# Patient Record
Sex: FEMALE | Employment: OTHER | URBAN - METROPOLITAN AREA
[De-identification: names, ages, dates, MRNs, and addresses within clinical notes are randomized per-mention and may not be internally consistent; named-entity substitution may affect disease eponyms.]

---

## 2021-02-08 ENCOUNTER — OFFICE VISIT (OUTPATIENT)
Dept: ENT CLINIC | Age: 71
End: 2021-02-08
Payer: MEDICARE

## 2021-02-08 VITALS
HEART RATE: 61 BPM | WEIGHT: 134.2 LBS | SYSTOLIC BLOOD PRESSURE: 91 MMHG | TEMPERATURE: 98.6 F | DIASTOLIC BLOOD PRESSURE: 59 MMHG

## 2021-02-08 DIAGNOSIS — H92.02 OTALGIA OF LEFT EAR: Primary | ICD-10-CM

## 2021-02-08 PROCEDURE — G8484 FLU IMMUNIZE NO ADMIN: HCPCS | Performed by: OTOLARYNGOLOGY

## 2021-02-08 PROCEDURE — G8421 BMI NOT CALCULATED: HCPCS | Performed by: OTOLARYNGOLOGY

## 2021-02-08 PROCEDURE — 3017F COLORECTAL CA SCREEN DOC REV: CPT | Performed by: OTOLARYNGOLOGY

## 2021-02-08 PROCEDURE — 1036F TOBACCO NON-USER: CPT | Performed by: OTOLARYNGOLOGY

## 2021-02-08 PROCEDURE — 4040F PNEUMOC VAC/ADMIN/RCVD: CPT | Performed by: OTOLARYNGOLOGY

## 2021-02-08 PROCEDURE — G8427 DOCREV CUR MEDS BY ELIG CLIN: HCPCS | Performed by: OTOLARYNGOLOGY

## 2021-02-08 PROCEDURE — 1090F PRES/ABSN URINE INCON ASSESS: CPT | Performed by: OTOLARYNGOLOGY

## 2021-02-08 PROCEDURE — 99203 OFFICE O/P NEW LOW 30 MIN: CPT | Performed by: OTOLARYNGOLOGY

## 2021-02-08 PROCEDURE — 1123F ACP DISCUSS/DSCN MKR DOCD: CPT | Performed by: OTOLARYNGOLOGY

## 2021-02-08 PROCEDURE — G8400 PT W/DXA NO RESULTS DOC: HCPCS | Performed by: OTOLARYNGOLOGY

## 2021-02-08 RX ORDER — AMOXICILLIN 500 MG/1
500 CAPSULE ORAL 2 TIMES DAILY
COMMUNITY
Start: 2021-02-05 | End: 2021-02-08 | Stop reason: SDUPTHER

## 2021-02-08 RX ORDER — AMOXICILLIN 500 MG/1
500 CAPSULE ORAL 3 TIMES DAILY
Qty: 30 CAPSULE | Refills: 0 | Status: SHIPPED | OUTPATIENT
Start: 2021-02-08 | End: 2021-02-18

## 2021-02-08 RX ORDER — ROSUVASTATIN CALCIUM 20 MG/1
20 TABLET, COATED ORAL DAILY
COMMUNITY
Start: 2020-12-09

## 2021-02-08 RX ORDER — ACETAMINOPHEN 500 MG
1000 TABLET ORAL EVERY 6 HOURS PRN
COMMUNITY

## 2021-02-08 NOTE — PATIENT INSTRUCTIONS
You may use acetaminophen (eg. Tylenol) or Ibuprofen (eg. Advil) OTC as needed for pain. NO Q-TIPS OR OTHER INSTRUMENTS/OBJECTS IN THE EARS   You should never clean your ears with a Q-tip, cotton tipped applicator, Lucy pin, paper clip, pen cap, nail file, Marcos head screwdriver, or any other instrument. This will tend to push wax in deeper and pack the ear canal with wax. There is a high risk and danger of this practice, especially rupture of ear drum, dislocation or other damage to ossicles, and permanent, irreversible, and irreparable hearing loss and/or dizziness. I recommend only use of one the several ear wax removal kits available \"over the counter\" if you feel a need to try to remove ear wax. For example, Murine, Bausch and Lomb, NeilMed, or Debrox ear wax removal kits may be used for ear wax removal, as needed. No other methods should be self used for cleaning your ears.

## 2021-02-08 NOTE — PROGRESS NOTES
[facility]       NEW PATIENT VISIT      PCP:  No primary care provider on file. REFERRED BY:   self      CHIEF COMPLAINT:  Chief Complaint   Patient presents with    Otalgia     Left ear. HISTORY OF PRESENT ILLNESS:       El Fernando is a 69 y.o. female here for evaluation and treatment of left ear pain. \"Excruciating\"  She is using Tylenol which controls the pain. No drainage. Using homeopathic medication drops for ear pain. She stated that she Felt better Tuesday and worse Tuesday evening and Wednesday and fierce again on Friday. Started amoxicillin on Friday night 500 mg tid ('s medication he had for upcoming dental work). \"I have been drying my ear with a Q-tip. About two weeks ago today, I poked it in too far. I felt instantaneous pain. Was a little achy off and on. One week ago Sat morning, getting worse. And has been getting worse. REVIEW OF SYSTEMS:    Review of Systems   Constitutional: Positive for chills (through the week. ). Negative for fever. HENT: Positive for ear pain (left). PAST MEDICAL HISTORY:    History reviewed. No pertinent past medical history. History reviewed. No pertinent surgical history. EXAMINATION:      Vitals:    02/08/21 1031   BP: (!) 91/59   Site: Left Upper Arm   Position: Sitting   Cuff Size: Medium Adult   Pulse: 61   Temp: 98.6 °F (37 °C)   TempSrc: Temporal   Weight: 134 lb 3.2 oz (60.9 kg)         Physical Exam  Vitals signs reviewed. Constitutional:       General: She is not in acute distress. Appearance: Normal appearance. She is well-developed. She is not ill-appearing or toxic-appearing. HENT:      Head: Normocephalic and atraumatic. Salivary Glands: Right salivary gland is not diffusely enlarged or tender. Left salivary gland is not diffusely enlarged or tender.       Right Ear: Tympanic membrane, ear canal and external ear normal. Tympanic membrane is not perforated or erythematous. Tympanic membrane has normal mobility. Left Ear: Tympanic membrane, ear canal and external ear normal. Tympanic membrane is not perforated or erythematous. Tympanic membrane has normal mobility. Ears:      Stevenson exam findings: lateralizes left. Right Rinne: AC > BC. Left Rinne: AC > BC. Nose: No nasal deformity, septal deviation, mucosal edema, congestion or rhinorrhea. Right Turbinates: Not enlarged. Left Turbinates: Not enlarged. Right Sinus: No maxillary sinus tenderness or frontal sinus tenderness. Left Sinus: No maxillary sinus tenderness or frontal sinus tenderness. Mouth/Throat:      Lips: Pink. No lesions. Mouth: Mucous membranes are moist. No oral lesions. Tongue: No lesions. Palate: No mass and lesions. Pharynx: Oropharynx is clear. Uvula midline. No oropharyngeal exudate or posterior oropharyngeal erythema. Tonsils: 0 on the right. 0 on the left. Comments: post tonsillectomy. Neck:      Musculoskeletal: Normal range of motion and neck supple. No neck rigidity or muscular tenderness. Thyroid: No thyroid mass, thyromegaly or thyroid tenderness. Lymphadenopathy:      Cervical: No cervical adenopathy. Neurological:      Mental Status: She is alert. TM left mobile   mastoids normal bilateral  TMJs nontender         IMPRESSION / DIAGNOSES / ORDERS:       Stefani was seen today for otalgia. Diagnoses and all orders for this visit:    Otalgia of left ear  -     amoxicillin (AMOXIL) 500 MG capsule; Take 1 capsule by mouth 3 times daily for 10 days         RECOMMENDATIONS/PLAN:      Return if symptoms worsen or fail to clear with treatment or, for any further ENT or sinus problems or symptoms. Patient Instructions   You may use acetaminophen (eg. Tylenol) or Ibuprofen (eg. Advil) OTC as needed for pain.       NO Q-TIPS OR OTHER INSTRUMENTS/OBJECTS IN THE EARS   You should never clean your ears with a Q-tip, cotton tipped applicator, Lucy pin, paper clip, pen cap, nail file, Marcos head screwdriver, or any other instrument. This will tend to push wax in deeper and pack the ear canal with wax. There is a high risk and danger of this practice, especially rupture of ear drum, dislocation or other damage to ossicles, and permanent, irreversible, and irreparable hearing loss and/or dizziness. I recommend only use of one the several ear wax removal kits available \"over the counter\" if you feel a need to try to remove ear wax. For example, Murine, Bausch and Lomb, NeilMed, or Debrox ear wax removal kits may be used for ear wax removal, as needed. No other methods should be self used for cleaning your ears.            MEDICAL DECISION MAKING    # and complexity of problems addressed:  23611 - Low  1 acute, uncomplicated illness or injury     Amount and/or Complexity of Data to be Reviewed and Analyzed  86348 - Straightforward  no data or only 1 test or document reviewed or ordered    Risk of Complications and /or Morbidity or Mortality of Patient Management  90301 - Moderate  Prescription drug management